# Patient Record
Sex: MALE | URBAN - METROPOLITAN AREA
[De-identification: names, ages, dates, MRNs, and addresses within clinical notes are randomized per-mention and may not be internally consistent; named-entity substitution may affect disease eponyms.]

---

## 2019-10-15 ENCOUNTER — TELEPHONE (OUTPATIENT)
Dept: UROLOGY | Facility: MEDICAL CENTER | Age: 64
End: 2019-10-15

## 2019-10-15 NOTE — TELEPHONE ENCOUNTER
Called and attempted to schedule patient  Patient requested that someone who is [place] speaking call him back to schedule

## 2019-10-15 NOTE — TELEPHONE ENCOUNTER
Awaiting patients records to be faxed to [facility] office then will schedule patient accordingly

## 2019-10-15 NOTE — TELEPHONE ENCOUNTER
Please book patient with Marianela DELACRUZ for elevated PSA       Since Patient is new patient please make appt 30 minutes so she can be more thorough    No need for MD visit

## 2019-10-15 NOTE — TELEPHONE ENCOUNTER
TRIAGE NEW PATIENT  PSA 8 [phone]  Insurance [facility] Dual Complete  Previous Urologist = Dr Sharyle Chen  Records requested from Dr Luis Eduardo Bacon office  Reported they will fax directly to [facility] office  Testing done at [facility]  Prefers [facility] office    No personal history of cancer  He can be reached at [phone]  Thank you

## 2019-10-15 NOTE — TELEPHONE ENCOUNTER
Left message for patient to return call to schedule office visit for Daniel Daugherty in [place] for 11/11/2019